# Patient Record
Sex: FEMALE
[De-identification: names, ages, dates, MRNs, and addresses within clinical notes are randomized per-mention and may not be internally consistent; named-entity substitution may affect disease eponyms.]

---

## 2022-12-10 ENCOUNTER — HOSPITAL ENCOUNTER (EMERGENCY)
Dept: HOSPITAL 97 - ER | Age: 77
Discharge: HOME | End: 2022-12-10
Payer: COMMERCIAL

## 2022-12-10 VITALS — SYSTOLIC BLOOD PRESSURE: 150 MMHG | DIASTOLIC BLOOD PRESSURE: 68 MMHG

## 2022-12-10 VITALS — OXYGEN SATURATION: 99 %

## 2022-12-10 VITALS — TEMPERATURE: 98.7 F

## 2022-12-10 DIAGNOSIS — R19.03: Primary | ICD-10-CM

## 2022-12-10 DIAGNOSIS — Z88.0: ICD-10-CM

## 2022-12-10 LAB
ALBUMIN SERPL BCP-MCNC: 3.5 G/DL (ref 3.4–5)
ALP SERPL-CCNC: 86 U/L (ref 45–117)
ALT SERPL W P-5'-P-CCNC: 29 U/L (ref 13–56)
AST SERPL W P-5'-P-CCNC: 30 U/L (ref 15–37)
BUN BLD-MCNC: 14 MG/DL (ref 7–18)
GLUCOSE SERPLBLD-MCNC: 123 MG/DL (ref 74–106)
HCT VFR BLD CALC: 37.8 % (ref 36–45)
LIPASE SERPL-CCNC: 199 U/L (ref 73–393)
LYMPHOCYTES # SPEC AUTO: 2.7 K/UL (ref 0.7–4.9)
MCV RBC: 87.7 FL (ref 80–100)
PMV BLD: 8.4 FL (ref 7.6–11.3)
POTASSIUM SERPL-SCNC: 3.8 MMOL/L (ref 3.5–5.1)
RBC # BLD: 4.31 M/UL (ref 3.86–4.86)

## 2022-12-10 PROCEDURE — 81003 URINALYSIS AUTO W/O SCOPE: CPT

## 2022-12-10 PROCEDURE — 85025 COMPLETE CBC W/AUTO DIFF WBC: CPT

## 2022-12-10 PROCEDURE — 80053 COMPREHEN METABOLIC PANEL: CPT

## 2022-12-10 PROCEDURE — 74177 CT ABD & PELVIS W/CONTRAST: CPT

## 2022-12-10 PROCEDURE — 96375 TX/PRO/DX INJ NEW DRUG ADDON: CPT

## 2022-12-10 PROCEDURE — 83690 ASSAY OF LIPASE: CPT

## 2022-12-10 PROCEDURE — 81015 MICROSCOPIC EXAM OF URINE: CPT

## 2022-12-10 PROCEDURE — 83605 ASSAY OF LACTIC ACID: CPT

## 2022-12-10 PROCEDURE — 36415 COLL VENOUS BLD VENIPUNCTURE: CPT

## 2022-12-10 PROCEDURE — 99284 EMERGENCY DEPT VISIT MOD MDM: CPT

## 2022-12-10 PROCEDURE — 96374 THER/PROPH/DIAG INJ IV PUSH: CPT

## 2022-12-10 NOTE — RAD REPORT
EXAM DESCRIPTION:  CTAbdomen   Pelvis W Contrast - 12/10/2022 4:39 pm

 

CLINICAL HISTORY:

right lower abdomen/flank pain for 10 days

 

COMPARISON:  None

 

TECHNIQUE:  CT of the abdomen and pelvis was performed.

 

All CT scans are performed using dose optimization technique as appropriate and may include automated
 exposure control or mA/KV adjustment according to patient size.

 

FINDINGS:  Lower chest: Nonspecific ground-glass opacities in the lung bases could reflect a prominen
t chronic interstitial lung disease which is not well assessed due to motion. Circumferential thicken
ed distal esophagus.

Liver: Hepatic steatosis

Biliary: No biliary ductal dilatation.

Stomach: No significant focal abnormality.

Duodenum: No significant focal abnormality.

Pancreas: Atrophy. No focal mass.

Spleen: No significant abnormality.

Adrenal: No suspicious lesions.

Kidney/ureter: No hydronephrosis. No renal calculi. Small left renal scar. Areas of apparent hypoatte
nuation at the kidneys bilaterally probably due to artifact from the patient's arms.

Retroperitoneum: No retroperitoneal adenopathy.

Vascular: No aneurysm. Atherosclerosis.

Bowel: No significant focal abnormality.

Peritoneum: No ascites or free air.

Bladder: Grossly unremarkable.

Reproductive: 4.3 cm right adnexal cystic lesion.

Bones: No acute fracture. Grade 1 anterolisthesis of L4 on L5.

Other: n/a

 

IMPRESSION:  No acute intra-abdominal or pelvic finding. No urinary tract calculi. No appendix identi
fied.

 

Right adnexal cyst measuring 4.3 cm which is abnormal in a postmenopausal patient. Recommend nonemerg
ent pelvic ultrasound.

## 2022-12-10 NOTE — ER
Nurse's Notes                                                                                     

 [facility]                                                                 

Name: Nichole Montana                                                                               

Age: 77 yrs                                                                                       

Sex: Female                                                                                       

: 1945                                                                                   

MRN: [id number]                                                                                   

Arrival Date: 12/10/2022                                                                          

Time: 13:44                                                                                       

Account#: [id number]                                                                            

Bed 24                                                                                            

Private MD:                                                                                       

Diagnosis: Right lower quadrant abdominal swelling, mass and lump;Right lower quadrant abdominal  

  tenderness                                                                                      

                                                                                                  

Presentation:                                                                                     

12/10                                                                                             

14:58 Chief complaint: Patient's son or daughter states: patient has had right lower          ko1 

      abdominal pain for 10 days which has progressively gotten worse. Coronavirus screen:        

      Vaccine status: Patient reports receiving the 2nd dose of the covid vaccine. At this        

      time, the client does not indicate any symptoms associated with coronavirus-19. Ebola       

      Screen: No symptoms or risks identified at this time. Initial Sepsis Screen: Does the       

      patient meet any 2 criteria? No. Patient's initial sepsis screen is negative. Does the      

      patient have a suspected source of infection? No. Patient's initial sepsis screen is        

      negative. Risk Assessment: Do you want to hurt yourself or someone else? Patient            

      reports no desire to harm self or others. Onset of symptoms was 2022.           

14:58 Method Of Arrival: Wheelchair                                                           ko1 

14:58 Acuity: RONIT 4                                                                           ko1 

                                                                                                  

Triage Assessment:                                                                                

14:59 General: Appears in no apparent distress. uncomfortable, Behavior is calm, cooperative, ko1 

      appropriate for age. Pain: Complains of pain in anterior aspect of right lateral            

      abdomen and right lower quadrant. GI: Reports lower abdominal pain.                         

                                                                                                  

Historical:                                                                                       

- Allergies:                                                                                      

14:59 PENICILLINS;                                                                            ko1 

- PSHx:                                                                                           

14:59 Appendectomy;                                                                           ko1 

                                                                                                  

- Immunization history:: Adult Immunizations unknown, Client reports receiving the 2nd            

  dose of the Covid vaccine.                                                                      

- Social history:: Smoking status: Patient denies any tobacco usage or history of.                

                                                                                                  

                                                                                                  

Screening:                                                                                        

15:05 Abuse screen: Denies threats or abuse. Denies injuries from another. Nutritional        ko1 

      screening: No deficits noted. Tuberculosis screening: No symptoms or risk factors           

      identified. Fall Risk None identified.                                                      

                                                                                                  

Assessment:                                                                                       

15:05 General: Appears in no apparent distress. uncomfortable, Behavior is calm, cooperative, ko1 

      appropriate for age. Pain: Complains of pain in abdomen and right lower quadrant and        

      anterior aspect of right lateral abdomen. Neuro: No deficits noted. Cardiovascular: No      

      deficits noted. Respiratory: No deficits noted. GI: Bowel sounds present X 4 quads. Abd     

      is soft and non tender X 4 quads. : No deficits noted. EENT: No deficits noted. Derm:     

      No deficits noted. Musculoskeletal: No deficits noted.                                      

                                                                                                  

Vital Signs:                                                                                      

15:05  / 66; Pulse 65; Pulse Ox 98% on R/A;                                             ko1 

15:15  / 61; Pulse 68; Resp 18; Temp 98.7(O); Pulse Ox 98% on R/A; Weight 58.97 kg (R); ko1 

      Height 4 ft. 9 in. (144.78 cm) (R); Pain 8/10;                                              

16:00  / 78; Pulse 62; Pulse Ox 99% on R/A;                                             ko1 

17:00  / 68; Pulse 65;                                                                  ko1 

17:30 Pain 2/10;                                                                              ko1 

15:15 Body Mass Index 28.13 (58.97 kg, 144.78 cm)                                             ko1 

                                                                                                  

ED Course:                                                                                        

13:44 Patient arrived in ED.                                                                  am2 

14:30 Ramu Garay PA is PHCP.                                                                cp  

14:30 Lamin Glass MD is Attending Physician.                                            cp  

14:59 Triage completed.                                                                       ko1 

15:01 Arm band placed on right wrist.                                                         ko1 

15:05 Patient has correct armband on for positive identification. Placed in gown. Bed in low  ko1 

      position. Call light in reach. Side rails up X 1. Adult w/ patient. Client placed on        

      continuous cardiac and pulse oximetry monitoring. NIBP monitoring applied.                  

15:05 No provider procedures requiring assistance completed.                                  ko1 

15:15 Angela Ignacio, RN is Primary Nurse.                                                     ko1 

15:16 Urine Microscopic Only Sent.                                                            ko1 

15:28 Lactate w/ 2H reflex if indic. Sent.                                                    ko1 

15:29 CBC with Diff Sent.                                                                     ko1 

15:29 CMP Sent.                                                                               ko1 

15:29 Lipase Sent.                                                                            ko1 

15:41 Inserted saline lock: 22 gauge in left antecubital area, using aseptic technique. Blood ko1 

      collected.                                                                                  

16:41 Abdomen In Process Unspecified.                                                         EDMS

18:47 IV discontinued, intact, bleeding controlled, No redness/swelling at site. Pressure     ko1 

      dressing applied.                                                                           

                                                                                                  

Administered Medications:                                                                         

15:33 Drug: Zofran (Ondansetron) 4 mg Route: IVP; Site: left antecubital;                     ko1 

15:33 Drug: NS 0.9% 500 ml Route: IV; Rate: calculated rate; Site: left antecubital;          ko1 

15:41 Drug: fentaNYL (PF) 25 mcg Route: IVP; Site: left antecubital;                          ko1 

                                                                                                  

                                                                                                  

Medication:                                                                                       

18:47 VIS not applicable for this client.                                                     ko1 

                                                                                                  

Outcome:                                                                                          

18:30 Discharge ordered by MD.                                                                sarika  

18:47 Discharged to home ambulatory, with family.                                             ko1 

18:47 Condition: improved                                                                         

18:47 Discharge instructions given to patient, family, Instructed on discharge instructions,      

      follow up and referral plans. medication usage, Demonstrated understanding of               

      instructions, follow-up care, medications, Prescriptions given X 2.                         

18:48 Patient left the ED.                                                                    ko1 

                                                                                                  

Signatures:                                                                                       

Dispatcher [employer]                           EDMS                                                 

Ramu Garay PA PA cp Moreno, Amanda am2                                                  

Angela Ignacio, RN                       RN   ko1                                                  

                                                                                                  

**************************************************************************************************

## 2022-12-10 NOTE — EDPHYS
Physician Documentation                                                                           

 [facility]                                                                 

Name: Nichole Montana                                                                               

Age: 77 yrs                                                                                       

Sex: Female                                                                                       

: 1945                                                                                   

MRN: [id number]                                                                                   

Arrival Date: 12/10/2022                                                                          

Time: 13:44                                                                                       

Account#: [id number]                                                                            

Bed 24                                                                                            

Private MD:                                                                                       

ED Physician Lamin Glass                                                                     

HPI:                                                                                              

12/10                                                                                             

15:05 This 77 yrs old  Female presents to ER via Wheelchair with complaints of        cp  

      Abdominal Pain - low, Back Pain, Flank Pain - right.                                        

15:05 The patient presents with abdominal pain right lower quadrant, right flank. Onset: The  cp  

      symptoms/episode began/occurred 10 day(s) ago. The symptoms radiate to right back.          

      Associated signs and symptoms: Pertinent negatives: blood in stools, chest pain,            

      constipation, diarrhea, dysuria, fever, vomiting. The symptoms are described as             

      constant. Severity of pain: in the emergency department the pain is unchanged despite       

      home interventions.                                                                         

                                                                                                  

Historical:                                                                                       

- Allergies:                                                                                      

14:59 PENICILLINS;                                                                            ko1 

- PSHx:                                                                                           

14:59 Appendectomy;                                                                           ko1 

                                                                                                  

- Immunization history:: Adult Immunizations unknown, Client reports receiving the 2nd            

  dose of the Covid vaccine.                                                                      

- Social history:: Smoking status: Patient denies any tobacco usage or history of.                

                                                                                                  

                                                                                                  

ROS:                                                                                              

15:10 Constitutional: Negative for body aches, chills, fever, poor PO intake.                 cp  

15:10 Eyes: Negative for injury, pain, redness, and discharge.                                cp  

15:10 ENT: Negative for drainage from ear(s), ear pain, sore throat, difficulty swallowing,       

      difficulty handling secretions.                                                             

15:10 Cardiovascular: Negative for chest pain, edema, palpitations.                               

15:10 Respiratory: Negative for cough, shortness of breath, wheezing.                             

15:10 Abdomen/GI: Positive for abdominal pain, Negative for vomiting, diarrhea, constipation,     

      anorexia, black/tarry stool, rectal bleeding.                                               

15:10 Back: Positive for radiated pain.                                                           

15:10 : Negative for urinary symptoms, vaginal bleeding.                                        

15:10 Neuro: Negative for altered mental status, dizziness, headache, weakness.                   

15:10 All other systems are negative.                                                             

                                                                                                  

Exam:                                                                                             

15:20 Constitutional: The patient appears in no acute distress, alert, awake, non-toxic, well cp  

      developed, well nourished, uncomfortable.                                                   

15:20 Head/Face:  Normocephalic, atraumatic.                                                  cp  

15:20 Eyes: Periorbital structures: appear normal, Conjunctiva: normal, no exudate, no            

      injection, Sclera: no appreciated abnormality, Lids and lashes: appear normal,              

      bilaterally.                                                                                

15:20 ENT: External ear(s): are unremarkable, Nose: is normal, Mouth: Lips: moist, Oral           

      mucosa: pink and intact, moist, Posterior pharynx: Airway: no evidence of obstruction,      

      patent.                                                                                     

15:20 Chest/axilla: Inspection: normal.                                                           

15:20 Cardiovascular: Rate: normal, Rhythm: regular, Edema: is not appreciated, JVD: is not       

      appreciated.                                                                                

15:20 Respiratory: the patient does not display signs of respiratory distress,  Respirations:     

      normal, no use of accessory muscles, no retractions, labored breathing, is not present,     

      Breath sounds: are clear throughout, no decreased breath sounds, no stridor, no             

      wheezing.                                                                                   

15:20 Abdomen/GI: Inspection: abdomen appears normal, Bowel sounds: active, all quadrants,        

      Palpation: soft, in all quadrants, moderate abdominal tenderness, in the posterior          

      aspect of right lateral abdomen, anterior aspect of right lateral abdomen and right         

      lower quadrant, rebound tenderness, is not appreciated, involuntary guarding, is not        

      appreciated.                                                                                

15:20 Back: CVA tenderness, is absent.                                                            

15:20 Skin: cellulitis, is not appreciated, no rash present.                                      

15:20 Neuro: Orientation: to person, place \T\ time. Mentation: is normal, Motor: moves all       

      fours, strength is normal, Sensation: is normal, Gait: is steady, at a normal pace,         

      without difficulty.                                                                         

                                                                                                  

Vital Signs:                                                                                      

15:05  / 66; Pulse 65; Pulse Ox 98% on R/A;                                             ko1 

15:15  / 61; Pulse 68; Resp 18; Temp 98.7(O); Pulse Ox 98% on R/A; Weight 58.97 kg (R); ko1 

      Height 4 ft. 9 in. (144.78 cm) (R); Pain 8/10;                                              

16:00  / 78; Pulse 62; Pulse Ox 99% on R/A;                                             ko1 

17:00  / 68; Pulse 65;                                                                  ko1 

17:30 Pain 2/10;                                                                              ko1 

15:15 Body Mass Index 28.13 (58.97 kg, 144.78 cm)                                             ko1 

                                                                                                  

MDM:                                                                                              

14:44 Patient medically screened.                                                               

18:30 Data reviewed: vital signs, nurses notes, lab test result(s), radiologic studies, CT    cp  

      scan.                                                                                       

18:30 Differential diagnosis: bowel obstruction, diverticulitis, Peritonitis, Pyelonephritis, cp  

      Ureterolithiasis, urinary tract infection. Counseling: I had a detailed discussion with     

      the patient and/or guardian regarding: the historical points, exam findings, and any        

      diagnostic results supporting the discharge/admit diagnosis, lab results, radiology         

      results, the need for outpatient follow up, a family practitioner, to return to the         

      emergency department if symptoms worsen or persist or if there are any questions or         

      concerns that arise at home. Response to treatment: the patient's symptoms have             

      markedly improved after treatment. Special discussion: Based on the patient's Hx, exam,     

      and Dx evaluation, there is no indication for emergent surgery or inpatient Tx. It is       

      understood by the patient/guardian that if the Sx's persist or worsen they need to          

      return immediately for re-evaluation. ED course: VSS. Pain improved with meds.              

      Discussed results of CT that showed adnexal mass and radiology recommendation of            

      non-emergent US. Will discharge to home for continued monitoring.                           

                                                                                                  

12/10                                                                                             

15:00 Order name: CBC with Diff; Complete Time: 16:                                           

12/10                                                                                             

15:00 Order name: CMP; Complete Time: 16:                                                     

12/10                                                                                             

18:02 Interpretation: Normal except: GLUC 123; CRE 1.18; GFR 48; TP 8.8; GLOB 5.3; A/G 0.7.     

12/10                                                                                             

15:00 Order name: Lipase; Complete Time: 16:                                                  

12/10                                                                                             

15:00 Order name: Urine Microscopic Only; Complete Time: 16:                                  

12/10                                                                                             

18:06 Interpretation: Reviewed.                                                                 

12/10                                                                                             

15:00 Order name: Lactate w/ 2H reflex if indic.; Complete Time: 16:                          

12/10                                                                                             

15:15 Order name: Urine Dipstick-Ancillary; Complete Time: 16:                              EDMS

12/10                                                                                             

18:02 Interpretation: Normal except: UBLD Trace-intact; UPH 8.5; UESTR Trace.                   

12/10                                                                                             

15:00 Order name: IV Saline Lock; Complete Time: 15:29                                          

12/10                                                                                             

15:00 Order name: Labs collected and sent; Complete Time: 15:29                                 

12/10                                                                                             

15:00 Order name: Urine Dipstick-Ancillary (obtain specimen); Complete Time: 15:16            cp  

12/10                                                                                             

16:02 Order name: CT Abd/Pelvis - IV Contrast Only                                            cp  

12/10                                                                                             

16:06 Order name: Abdomen ; Complete Time: 18:01                                              EDMS

                                                                                                  

Administered Medications:                                                                         

15:33 Drug: Zofran (Ondansetron) 4 mg Route: IVP; Site: left antecubital;                     ko1 

15:33 Drug: NS 0.9% 500 ml Route: IV; Rate: calculated rate; Site: left antecubital;          ko1 

15:41 Drug: fentaNYL (PF) 25 mcg Route: IVP; Site: left antecubital;                          ko1 

                                                                                                  

                                                                                                  

Disposition Summary:                                                                              

12/10/22 18:30                                                                                    

Discharge Ordered                                                                                 

      Location: Home                                                                          cp  

      Problem: new                                                                            cp  

      Symptoms: have improved                                                                 cp  

      Condition: Stable                                                                       cp  

      Diagnosis                                                                                   

        - Right lower quadrant abdominal swelling, mass and lump                              cp  

        - Right lower quadrant abdominal tenderness                                           cp  

      Followup:                                                                               cp  

        - With: Private Physician                                                                  

        - When: 2 - 3 days                                                                         

        - Reason: Recheck today's complaints                                                       

      Discharge Instructions:                                                                     

        - Discharge Summary Sheet                                                             cp  

        - Abdominal Pain, Adult                                                               cp  

      Forms:                                                                                      

        - Medication Reconciliation Form                                                      cp  

        - Thank You Letter                                                                    cp  

        - Antibiotic Education                                                                cp  

        - Prescription Opioid Use                                                             cp  

      Prescriptions:                                                                              

        - Zofran 4 mg Oral Tablet                                                                  

            - take 1 tablet by ORAL route every 12 hours As needed; 20 tablet; Refills: 0,    cp  

      Product Selection Permitted                                                                 

        - dicyclomine 10 mg Oral Capsule                                                           

            - take 1 capsule by ORAL route 4 times per day; 30 capsule; Refills: 0, Product   cp  

      Selection Permitted                                                                         

Signatures:                                                                                       

Dispatcher [employer]                           EDMS                                                 

Ramu Garay PA PA   cp                                                   

Angela Ignacio RN                       RN   ko1                                                  

                                                                                                  

Corrections: (The following items were deleted from the chart)                                    

                                                                                             

17:12 17:10 This 77 yrs old  Female presents to ER via Wheelchair with complaints of  cp  

      Abdominal Pain - low, Back Pain, Flank Pain - right. cp                                     

                                                                                                  

**************************************************************************************************